# Patient Record
Sex: MALE | Race: WHITE | NOT HISPANIC OR LATINO | URBAN - NONMETROPOLITAN AREA
[De-identification: names, ages, dates, MRNs, and addresses within clinical notes are randomized per-mention and may not be internally consistent; named-entity substitution may affect disease eponyms.]

---

## 2017-08-07 PROBLEM — H50.53: Noted: 2017-08-07

## 2018-07-11 NOTE — PATIENT DISCUSSION
Update 07/12/2018:  Patient called and wants to go back to [facility].  He thought Ultra High was too strong.  Patient to be given samples above to be picked up.  Will order updated cl trials.  See above.

## 2019-08-15 ENCOUNTER — IMPORTED ENCOUNTER (OUTPATIENT)
Dept: URBAN - NONMETROPOLITAN AREA CLINIC 1 | Facility: CLINIC | Age: 49
End: 2019-08-15

## 2019-08-15 PROCEDURE — S0621 ROUTINE OPHTHALMOLOGICAL EXA: HCPCS

## 2019-08-15 PROCEDURE — 92310 CONTACT LENS FITTING OU: CPT

## 2019-08-15 NOTE — PATIENT DISCUSSION
Doing well with CLs and [employer]. DW. One month replacement. P/N vertical muscle imbalance. Asymptomatic. Has head tilt to L shoulder. 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 Works out at [employer]. Book history of [employer] just got published.

## 2020-11-06 ENCOUNTER — IMPORTED ENCOUNTER (OUTPATIENT)
Dept: URBAN - NONMETROPOLITAN AREA CLINIC 1 | Facility: CLINIC | Age: 50
End: 2020-11-06

## 2020-11-06 PROCEDURE — 92310 CONTACT LENS FITTING OU: CPT

## 2020-11-06 PROCEDURE — S0621 ROUTINE OPHTHALMOLOGICAL EXA: HCPCS

## 2020-11-06 NOTE — PATIENT DISCUSSION
P/N vertical muscle imbalance. Asymptomatic. Has head tilt to L shoulder. No diplopia. Myopia-Discussed diagnosis with patient. -Explained that people who are myopic are at a higher risk for developing RD/RT and reviewed associated S&S.-Pt to contact our office if symptoms develop. Presbyopia-Discussed diagnosis with patient. Updated spec Rx given. Recommend lens that will provide comfort as well as protect safety and health of eyes. CL wear-CLs fit and center well.-Stressed that patient should not sleep in CL. -Updated CL Rx given. 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 Works out at [employer]. Book history of [facility] just got published.

## 2020-11-10 PROBLEM — H52.4: Noted: 2020-11-10

## 2020-11-10 PROBLEM — H50.53: Noted: 2020-11-10

## 2020-11-10 PROBLEM — H52.13: Noted: 2020-11-10

## 2021-11-12 ENCOUNTER — IMPORTED ENCOUNTER (OUTPATIENT)
Dept: URBAN - NONMETROPOLITAN AREA CLINIC 1 | Facility: CLINIC | Age: 51
End: 2021-11-12

## 2021-11-12 PROCEDURE — 92310 CONTACT LENS FITTING OU: CPT

## 2021-11-12 PROCEDURE — S0621 ROUTINE OPHTHALMOLOGICAL EXA: HCPCS

## 2021-11-12 NOTE — PATIENT DISCUSSION
P/N vertical muscle imbalance. Asymptomatic. Has head tilt to L shoulder. No diplopia. Myopia-Discussed diagnosis with patient. -Explained that people who are myopic are at a higher risk for developing RD/RT and reviewed associated S&S.-Pt to contact our office if symptoms develop. Presbyopia-Discussed diagnosis with patient. Updated spec Rx given. Recommend lens that will provide comfort as well as protect safety and health of eyes. CL wear-CLs fit and center well.-Stressed that patient should not sleep in CL. -Updated CL Rx given. 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 Works out at [employer]. Book history of [employer] just got published.

## 2022-04-09 ASSESSMENT — VISUAL ACUITY
OD_SC: 20/25 W/ READERS
OU_SC: 20/20
OS_SC: 20/25 W/ READERS
OD_SC: 20/20
OD_SC: 20/25
OU_SC: 20/25 W/ READERS
OU_CC: 20/25 W/ READERS
OS_SC: 20/20
OD_SC: 20/20
OS_SC: 20/20
OD_CC: 20/25 W/ READERS
OD_SC: 20/20
OU_SC: 20/20
OU_SC: J1+
OS_CC: 20/25 W/ READERS
OS_SC: 20/20

## 2022-04-09 ASSESSMENT — TONOMETRY
OS_IOP_MMHG: 14
OS_IOP_MMHG: 14
OD_IOP_MMHG: 14
OS_IOP_MMHG: 14